# Patient Record
Sex: MALE | Race: WHITE | NOT HISPANIC OR LATINO | URBAN - METROPOLITAN AREA
[De-identification: names, ages, dates, MRNs, and addresses within clinical notes are randomized per-mention and may not be internally consistent; named-entity substitution may affect disease eponyms.]

---
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
## 2021-05-07 NOTE — PROGRESS NOTES
Progress Note      Patient Name: Alden Khan   Patient ID: [id number]   Sex: Male   YOB: 2000        Visit Date: August 9, 2018    Provider: RONDA Guadalupe   Location: [facility], [place], operated by [facility]   Location Address: [address] [place] [address]  [id number]   Location Phone: [phone]          Chief Complaint     here for vaccine update only       History Of Present Illness  Alden Khan is a 17 year old /White male who presents for evaluation and treatment of:      here for immunizations - he is a Mikie at [employer] - no issues or complaints today       Past Medical History  Disease Name Date Onset Notes   **No Past Medical History --  --          Allergy List  Allergen Name Date Reaction Notes   NO KNOWN DRUG ALLERGIES --  --  --          Family Medical History  Disease Name Relative/Age Notes   Hypertension / Mother    Mother/          Social History  Finding Status Start/Stop Quantity Notes   Alcohol Never --/-- --  never drinks alcohol   Recreational Drug Use Never --/-- --  never used   Second hand smoke exposure Unknown --/-- --  yes   Tobacco Never --/-- --  never smoker, never uses other tobacco products         Immunizations  NameDate Admin Mfg Trade Name Lot Number Route Inj VIS Given VIS Publication   DTaP09/12/2005 NE Not Entered  NE NE 08/08/2018    Comments:    DTaP04/03/2003 NE Not Entered  NE NE 08/08/2018    Comments:    DTaP02/26/2002 NE Not Entered  NE NE 08/08/2018    Comments:    DTaP01/04/2002 NE Not Entered  NE NE 08/08/2018    Comments:    DTaP2001 NE Not Entered  NE NE 08/08/2018    Comments:    HepB02/26/2002 NE Not Entered  NE NE 08/08/2018    Comments:    HepB2001 NE Not Entered  NE NE 08/08/2018    Comments:    HepB2001 NE Not Entered  NE NE 08/08/2018    Comments:    Hib04/03/2003 NE Not Entered  NE NE 08/08/2018    Comments:    Hib02/26/2002 NE Not Entered  NE NE 08/08/2018    Comments:    Hib01/04/2002 NE Not  "Entered  NE NE 08/08/2018    Comments:    Hib2001 NE Not Entered  NE NE 08/08/2018    Comments:    IPV09/16/2005 NE Not Entered  NE NE 08/08/2018    Comments:    IPV07/16/2002 NE Not Entered  NE NE 08/08/2018    Comments:    IPV01/04/2002 NE Not Entered  NE NE 08/08/2018    Comments:    IPV2001 NE Not Entered  NE NE 08/08/2018    Comments:    Nzmyiugwhpnay19/04/2013 NE Not Entered  NE NE 08/08/2018    Comments:    MMR09/12/2005 NE Not Entered  NE NE 08/08/2018    Comments:    MMR12/10/2002 NE Not Entered  NE NE 08/08/2018    Comments:    Diihgcf4300/10/2002 NE Not Entered  NE NE 08/08/2018    Comments:    Kxigtsq8874/28/2002 NE Not Entered  NE NE 08/08/2018    Comments:    Hkixznd6169/04/2002 NE Not Entered  NE NE 08/08/2018    Comments:    Zzzgemn422001 NE Not Entered  NE NE 08/08/2018    Comments:    Tdap03/04/2013 NE Not Entered  NE NE 08/08/2018    Comments:    Unerbseqg75/04/2013 NE Not Entered  NE NE 08/08/2018    Comments:    Autspysxj01/28/2002 NE Not Entered  NE NE 08/08/2018    Comments:          Review of Systems  · Constitutional  o Denies  o : fever, chills  · HENT  o Denies  o : sinus pain, nasal congestion, nasal discharge, sore throat  · Gastrointestinal  o Denies  o : nausea, vomiting, diarrhea      Vitals  Date Time BP Position Site L\R Cuff Size HR RR TEMP(F) WT  HT  BMI kg/m2 BSA m2 O2 Sat        08/09/2018 04:42 /76 Sitting    73 - R  98.2 198lbs 2oz 5'  8.5\" 29.69 2.08 98 %           Physical Examination  · Constitutional  o Appearance  o : well developed, well-nourished, in no acute distress  · Head and Face  o HEENT  o : Unremarkable  · Eyes  o Conjunctivae  o : conjunctivae normal  o Pupils and Irises  o : pupils equal and round, pupils reactive to light bilaterally  · Neck  o Inspection/Palpation  o : supple  o Thyroid  o : no thyromegaly  · Respiratory  o Respiratory Effort  o : breathing unlabored  o Auscultation of Lungs  o : clear to " ascultation  · Cardiovascular  o Heart  o :   § Auscultation of Heart  § : regular rate and rhythm  o Peripheral Vascular System  o :   § Extremities  § : no edema  · Lymphatic  o Neck  o : no lymphadenopathy present  · Musculoskeletal  o General  o :   § General Musculoskeletal  § : No joint swelling or deformity. Muscle tone, strength, and development grossly normal.  · Skin and Subcutaneous Tissue  o General Inspection  o : NL tone  · Neurologic  o Gait and Station  o :   § Gait Screening  § : normal gait  · Psychiatric  o Mood and Affect  o : mood normal, affect appropriate              Assessment  · Encounter for administration of vaccine     V05.9/Z23      Plan  · Orders  o Immunization Admin Fee (2+ Injections) ([facility]) (66936) - - 08/09/2018  o Immunization Admin Fee (Single) ([facility]) (99094) - - 08/09/2018  o ACO-39: Current medications updated and reviewed () - - 08/09/2018  o Havrix Pediatric/Adolescent Vaccine (720EL.U./0.5mL) (58135) - V05.9/Z23 - 08/09/2018   Vaccine - HepA; Dose: 0.5; Site: Left Upper Arm; Route: Intramuscular; Date: 08/09/2018 17:38:00; Exp: 06/26/2020; Lot: [id number]; Mfg: [employer]; TradeName: Havrix Peds 2 dose; Administered By: Arabella Jurado MA; Comment: NDC#[id number]  o Menveo Vaccine (75592) - V05.9/Z23 - 08/09/2018   Vaccine - Meningococcal; Dose: 0.5; Site: Right Upper Arm; Route: Intramuscular; Date: 08/09/2018 17:39:00; Exp: 07/28/2019; Lot: [id number]; Mfg: Not Entered; TradeName: Not Entered; Administered By: Arabella Jurado MA; Comment: NDC# [id number]  o Trumenba Vaccination (78676) - V05.9/Z23 - 08/09/2018   Vaccine - MenB; Dose: 0.5; Site: Right Upper Arm; Route: Intramuscular; Date: 08/09/2018 17:40:00; Exp: 12/28/2019; Lot: [id number]; Mfg: [employer]; TradeName: Trumenba; Administered By: Arabella Jurado MA; Comment: NDC# [id number]  · Instructions  o Handouts were given to patient: VIS sheets.   o Patient was educated/instructed on their diagnosis,  treatment and medications prior to discharge from the clinic today.  · Disposition  o Return Visit Request in/on 1 month +/- 2 days (5015).            Electronically Signed by: RONDA Guadalupe -Author on August 9, 2018 05:56:34 PM

## 2021-05-07 NOTE — PROGRESS NOTES
Progress Note      Patient Name: Alden Khan   Patient ID: [id number]   Sex: Male   YOB: 2000        Visit Date: July 5, 2019    Provider: RONDA Guadalupe   Location: [facility]   Location Address: [address] [place] [address]   [place]  [id number]   Location Phone: [phone]          Chief Complaint  · 18-year-old well child visit  · Sports physical      History Of Present Illness  The patient is here for a well child visit.   Interval History and Concerns  Nutrition  He is eating well-balanced meals and healthy snacks with no concerns. He drinks low-fat milk.   Social Development/Activities/Risk Factors  Home: no social concerns were raised regarding home.   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   Sports Participation: Alden Khan is participating in football and for his high school varsity team and.   Substance Use: the patient reports that he drinks alcohol occasionally, has never smoked and does not use drugs.   Puberty/Sexuality: The patient has attained normal sexual development for age. The patient reports being sexually active. He always uses condoms when having sex.   Mental Health: He denies any emotional or behavioral concerns.   The patient drives and uses a seat belt always. He rides an ATV and always wears a helmet.   There is no family history of elevated cholesterol levels or myocardial infarction before the age of 50.   Dental Screen  The child has a dental appointment in the next 6 months, going Monday due to right dental pain and ear discomfort. Brushes teeth daily.   Immunizations (Alt V)    Immunizations: Up to date      feels SOB with running - feels like he has wheezing when symptoms worsen since April       Past Medical History  Disease Name Date  Onset Notes   **No Past Medical History --  --          Allergy List  Allergen Name Date Reaction Notes   NO KNOWN DRUG ALLERGIES --  --  --          Family Medical History  Disease Name Relative/Age Notes   Hypertension Mother/   Mother         Social History  Finding Status Start/Stop Quantity Notes   Alcohol Never --/-- --  never drinks alcohol   Recreational Drug Use Never --/-- --  never used   Second hand smoke exposure Unknown --/-- --  yes   Tobacco Never --/-- --  never smoker, never uses other tobacco products         Immunizations  NameDate Admin Mfg Trade Name Lot Number Route Inj VIS Given VIS Publication   DTaP09/12/2005 NE Not Entered  NE NE 08/08/2018    Comments:    DTaP04/03/2003 NE Not Entered  NE NE 08/08/2018    Comments:    DTaP02/26/2002 NE Not Entered  NE NE 08/08/2018    Comments:    DTaP01/04/2002 NE Not Entered  NE NE 08/08/2018    Comments:    DTaP2001 NE Not Entered  NE NE 08/08/2018    Comments:    HepA02/15/2019 SKB Havrix Peds 2 dose [id number] IM LA 02/15/2019 07/20/2016   Comments: NDC 47637-869-70   HepA08/09/2018 SKB Havrix Peds 2 dose 3TG52 IM  08/09/2018 07/20/2016   Comments: NDC#[id number]   HepB02/26/2002 NE Not Entered  NE NE 08/08/2018    Comments:    HepB2001 NE Not Entered  NE NE 08/08/2018    Comments:    HepB2001 NE Not Entered  NE NE 08/08/2018    Comments:    Hib04/03/2003 NE Not Entered  NE NE 08/08/2018    Comments:    Hib02/26/2002 NE Not Entered  NE NE 08/08/2018    Comments:    Hib01/04/2002 NE Not Entered  NE NE 08/08/2018    Comments:    Hib2001 NE Not Entered  NE NE 08/08/2018    Comments:    IPV09/16/2005 NE Not Entered  NE NE 08/08/2018    Comments:    IPV07/16/2002 NE Not Entered  NE NE 08/08/2018    Comments:    IPV01/04/2002 NE Not Entered  NE NE 08/08/2018    Comments:    IPV2001 NE Not Entered  NE NE 08/08/2018    Comments:    MenB08/09/2018 GUNNER Cochran [id number]   08/09/2018 03/03/2016   Comments: NDC# [id number]  "  Zkdcgetveewrk56/09/2018 NE Not Entered [id number]   08/09/2018 03/03/2016   Comments: NDC# [id number]   Odskfysdgxcnm31/04/2013 NE Not Entered  NE NE 08/08/2018    Comments:    MMR09/12/2005 NE Not Entered  NE NE 08/08/2018    Comments:    MMR12/10/2002 NE Not Entered  NE NE 08/08/2018    Comments:    Enhjjfb1740/10/2002 NE Not Entered  NE NE 08/08/2018    Comments:    Yhwvkka3464/28/2002 NE Not Entered  NE NE 08/08/2018    Comments:    Lilfmho0261/04/2002 NE Not Entered  NE NE 08/08/2018    Comments:    Ltantke692001 NE Not Entered  NE NE 08/08/2018    Comments:    Tdap03/04/2013 NE Not Entered  NE NE 08/08/2018    Comments:    Mdgbaebzv51/04/2013 NE Not Entered  NE NE 08/08/2018    Comments:    Yganfvnrz38/28/2002 NE Not Entered  NE NE 08/08/2018    Comments:          Review of Systems  · Constitutional  o Denies  o : fatigue, fever  · Eyes  o Denies  o : discharge from eye, changes in vision  · HENT  o Admits  o : headaches  o Denies  o : difficulty hearing, nasal congestion  · Cardiovascular  o Denies  o : chest pain, poor exercise tolerance  · Respiratory  o Admits  o : shortness of breath, wheezing, cough  · Gastrointestinal  o Denies  o : vomiting, diarrhea, constipation  · Genitourinary  o Denies  o : dysuria, hematuria  · Integument  o Denies  o : rash, itching, new skin lesions  · Neurologic  o Denies  o : altered mental status, muscular weakness  · Musculoskeletal  o Denies  o : joint pain, joint swelling, limitation of motion  · Psychiatric  o Denies  o : anxiety, depression  · Heme-Lymph  o Denies  o : lymph node enlargement or tenderness      Vitals  Date Time BP Position Site L\R Cuff Size HR RR TEMP (F) WT  HT  BMI kg/m2 BSA m2 O2 Sat        07/05/2019 02:20 /90 Sitting    107 - R  97.6 178lbs 2oz 5'  8.25\" 26.89 1.97 98 %          Physical Examination  · Constitutional  o Appearance  o : no acute distress, well-nourished  · Head and Face  o Head  o :   § Inspection  § : atraumatic, " normocephalic  · Eyes  o Eyes  o : extraocular movements intact, no scleral icterus, no conjunctival injection  · Ears, Nose, Mouth and Throat  o Ears  o :   § External Ears  § : normal  § Otoscopic Examination  § : mild erythema of the right TM  o Nose  o :   § Intranasal Exam  § : nares patent  o Oral Cavity  o :   § Oral Mucosa  § : moist mucous membranes  o Throat  o :   § Oropharynx  § : no inflammation or lesions present, tonsils within normal limits  · Respiratory  o Respiratory Effort  o : breathing comfortably, symmetric chest rise  o Auscultation of Lungs  o : clear to asculatation bilaterally, no wheezes, rales, or rhonchii  · Cardiovascular  o Heart  o :   § Auscultation of Heart  § : regular rate and rhythm, no murmurs, rubs, or gallops  o Peripheral Vascular System  o :   § Extremities  § : no edema  · Gastrointestinal  o Abdominal Examination  o :   § Abdomen  § : soft, non-tender, bowel sounds +  · Genitourinary  o Penis  o : normal general appearance, no lesions present  o Genitals  o :   § Genitals  § : normal male, testes descended, no hernia palpated  · Skin and Subcutaneous Tissue  o General Inspection  o : NL tone  · Neurologic  o Mental Status Examination  o :   § Orientation  § : grossly oriented to person, place and time  o Gait and Station  o :   § Gait Screening  § : normal gait  · Psychiatric  o General  o : normal mood and affect              Assessment  · Well Child Examination     V20.2/Z00.129  · Counseling on Injury Prevention     V65.43/Z71.89  · Exercise-induced shortness of breath     786.05/R06.02  · Sports physical     V70.3/Z02.5      Plan  · Orders  o ACO-39: Current medications updated and reviewed () - - 07/05/2019  · Medications  o Singulair 10 mg oral tablet   SIG: take 1 tablet (10 mg) by oral route once daily in the evening for 30 days   DISP: (30) tablets with 2 refills  Prescribed on 07/05/2019     o Ventolin HFA 90 mcg/actuation inhalation HFA aerosol inhaler    SIG: inhale 1 - 2 puffs (90 - 180 mcg) by inhalation route every 4-6 hours as needed   DISP: (1) 8 gm canister with 0 refills  Prescribed on 07/05/2019     · Instructions  o Anticipatory guidance given - do not drink and drive.  o Handout given with age-specific care instructions and safety precautions.  o Discussed and discouraged drugs, alcohol, sex, and cigarettes.  o Encourage regular exercise.  o Always wear seat belts when riding in the car.  o Discussed dental care.  o Sports participation discussed and form completed.  o Follow up in 2-4 weeks with no improvement with medication for further exam. Pt denies chest pain.             Electronically Signed by: RONDA Guadalupe -Author on July 5, 2019 03:04:48 PM

## 2021-05-07 NOTE — PROGRESS NOTES
"   Progress Note      Patient Name: Alden Khan   Patient ID: [id number]   Sex: Male   YOB: 2000        Visit Date: July 9, 2018    Provider: Don Cannon MD   Location: [facility], [place], operated [facility]   Location Address: [address] [place] [address]  [id number]   Location Phone: [phone]          Chief Complaint     here for a sports physical           History Of Present Illness  Alden Khan is a 17 year old /White male who presents for evaluation and treatment of:      pt needs sports physical- no medical problems  no smoking, alcohol, drug use       Past Medical History  Disease Name Date Onset Notes   **No Past Medical History --  --          Allergy List  Allergen Name Date Reaction Notes   NO KNOWN DRUG ALLERGIES --  --  --          Family Medical History  Disease Name Relative/Age Notes   Hypertension / Mother    Mother/          Social History  Finding Status Start/Stop Quantity Notes   Alcohol Never --/-- --  never drinks alcohol   Recreational Drug Use Never --/-- --  never used   Second hand smoke exposure Unknown --/-- --  yes   Tobacco Never --/-- --  never smoker, never uses other tobacco products         Review of Systems  · Constitutional  o Denies  o : fatigue, fever  · Eyes  o Denies  o : double vision, impaired vision, blurred vision, changes in vision  · Cardiovascular  o Denies  o : chest pain, syncope, palpitations  · Respiratory  o Denies  o : shortness of breath, cough  · Gastrointestinal  o Denies  o : nausea, vomiting, diarrhea  · Integument  o Denies  o : rash  · Neurologic  o Denies  o : altered mental status, headaches, dizziness  · Musculoskeletal  o Denies  o : joint pain  · Psychiatric  o Denies  o : anxiety, depression      Vitals  Date Time BP Position Site L\R Cuff Size HR RR TEMP(F) WT  HT  BMI kg/m2 BSA m2 O2 Sat        07/09/2018 12:58 /80 Sitting    70 - R  97.2 200lbs 2oz 5'  8.5\" 29.99 2.09 97 %           Physical " Examination  · Constitutional  o Appearance  o : well developed, well-nourished, in no acute distress  · Head and Face  o HEENT  o : Unremarkable  · Eyes  o Conjunctivae  o : conjunctivae normal  o Pupils and Irises  o : pupils equal and round, pupils reactive to light bilaterally  · Neck  o Inspection/Palpation  o : supple  o Thyroid  o : no thyromegaly  · Respiratory  o Respiratory Effort  o : breathing unlabored  o Auscultation of Lungs  o : clear to ascultation  · Cardiovascular  o Heart  o :   § Auscultation of Heart  § : regular rate and rhythm  o Peripheral Vascular System  o :   § Extremities  § : no edema  · Gastrointestinal  o Abdomen  o : soft, non-tender, non-distended, + bowel sounds, no hepatosplenomegaly, no masses palpated  · Genitourinary  o MALE  EXAM:  o : bilateral no hernias, no masses palpated, normal exam  · Musculoskeletal  o General  o :   § General Musculoskeletal  § : No joint swelling or deformity., Muscle tone, strength, and development grossly normal.  · Neurologic  o Gait and Station  o :   § Gait Screening  § : normal gait  · Psychiatric  o Mood and Affect  o : mood normal, affect appropriate          Assessment  · Sports physical     V70.3/Z02.5      Plan  · Orders  o ACO-39: Current medications updated and reviewed () - - 07/09/2018  · Instructions  o Patient was educated/instructed on their diagnosis, treatment and medications prior to discharge from the clinic today.  o pt cleared for sports for 1 yr.            Electronically Signed by: Don Cannon MD -Author on July 9, 2018 01:28:10 PM

## 2021-05-07 NOTE — PROGRESS NOTES
Progress Note      Patient Name: Alden Khan   Patient ID: [id number]   Sex: Male   YOB: 2000        Visit Date: November 28, 2018    Provider: RONDA Martinez   Location: [facility]   Location Address: [address] [place] [address]  [id number]   Location Phone: [phone]          Chief Complaint  · Not feeling well for 4 days      History Of Present Illness  Alden Khan is a 18 year old /White male who presents for evaluation and treatment of:      not feeling well for 4 days. His mother wanted him to come in. He started vomiting today, but has been very fatigued the past 3-4 days. He describes headaches, ear pain, sore throat. No abdominal pain or diarrhea. He has been going to school despite not feeling well.       Past Medical History  Disease Name Date Onset Notes   **No Past Medical History --  --          Allergy List  Allergen Name Date Reaction Notes   NO KNOWN DRUG ALLERGIES --  --  --          Family Medical History  Disease Name Relative/Age Notes   Hypertension / Mother    Mother/          Social History  Finding Status Start/Stop Quantity Notes   Alcohol Never --/-- --  never drinks alcohol   Recreational Drug Use Never --/-- --  never used   Second hand smoke exposure Unknown --/-- --  yes   Tobacco Never --/-- --  never smoker, never uses other tobacco products         Immunizations  Name Date Admin   DTaP 09/12/2005   DTaP 04/03/2003   DTaP 02/26/2002   DTaP 01/04/2002   DTaP 2001   HepA 08/09/2018   HepB 02/26/2002   HepB 2001   HepB 2001   Hib 04/03/2003   Hib 02/26/2002   Hib 01/04/2002   Hib 2001   IPV 09/16/2005   IPV 07/16/2002   IPV 01/04/2002   IPV 2001   MenB 08/09/2018   Meningococcal 08/09/2018   Meningococcal 03/04/2013   MMR 09/12/2005   MMR 12/10/2002   Yzakqrc59 12/10/2002   Xtgqwjt04 08/28/2002   Wtizrap47 01/04/2002   Fqyymws63 2001   Tdap 03/04/2013   Varicella 03/04/2013    Varicella 08/28/2002         Review of Systems  · Constitutional  o Admits  o : fatigue, chills  o Denies  o : fever  · Eyes  o Denies  o : discharge from eye  · HENT  o Admits  o : headaches, nasal discharge, sore throat, ear pain  o Denies  o : vertigo, lightheadedness, sinus pain, nasal congestion, tinnitus, ear fullness  · Cardiovascular  o Denies  o : chest pain, palpitations  · Respiratory  o Admits  o : cough  o Denies  o : shortness of breath, wheezing  · Gastrointestinal  o Admits  o : vomiting  o Denies  o : nausea, diarrhea, abdominal pain  · Integument  o Denies  o : rash      Vitals  Date Time BP Position Site L\R Cuff Size HR RR TEMP(F) WT  HT  BMI kg/m2 BSA m2 O2 Sat HC       11/28/2018 03:48 /84 Sitting    75 - R 18 98 192lbs 0oz    98 %          Physical Examination  · Constitutional  o Appearance  o : well developed, well-nourished, in no acute distress  · Eyes  o Conjunctivae  o : conjunctivae normal, no exudates present  o Pupils and Irises  o : pupils equal and round, pupils reactive to light bilaterally  · Ears, Nose, Mouth and Throat  o Ears  o :   § External Ears  § : auricle appearance normal bilaterally, no auricle tenderness to palpation present, external auditory canal appearance within normal limits  § Otoscopic Examination  § : tympanic membrane appearance within normal limits bilaterally  § Hearing  § : response to sound normal, no tinnitus  o Nose  o :   § External Nose  § : appearance normal  § Intranasal Exam  § : mucosa within normal limits, sinuses non tender to percussion  o Throat  o :   § Oropharynx  § : generalized hyperemia noted, tonsils within normal limits  · Neck  o Inspection/Palpation  o : supple  · Respiratory  o Respiratory Effort  o : breathing unlabored  o Auscultation of Lungs  o : clear to ascultation  · Cardiovascular  o Heart  o :   § Auscultation of Heart  § : regular rate and rhythm  o Peripheral Vascular System  o :   § Extremities  § : no  edema  · Gastrointestinal  o Abdominal Examination  o :   § Abdomen  § : soft, non-tender, non-distended, bowel sounds +  · Lymphatic  o Neck  o : no lymphadenopathy present  · Musculoskeletal  o General  o :   § General Musculoskeletal  § : Muscle tone, strength, and development grossly normal.  · Skin and Subcutaneous Tissue  o General Inspection  o : no lesions present, no areas of discoloration, skin turgor normal, texture normal  · Neurologic  o Gait and Station  o :   § Gait Screening  § : normal gait  · Psychiatric  o Mood and Affect  o : mood normal, affect appropriate              Assessment  · Fatigue     780.79/R53.83  · Headache     784.0/R51  · Pharyngitis, acute     462/J02.9  · Vomiting     787.03/R11.10      Plan  · Orders  o CBC with Auto Diff [facility] (81215) - 780.79/R53.83, 784.0/R51, 462/J02.9, 787.03/R11.10 - 11/28/2018  o Influenza A/B test (80671) - 780.79/R53.83, 784.0/R51, 462/J02.9, 787.03/R11.10 - 11/28/2018   negative A/B  o IOP - Rapid Strep (42346) - 462/J02.9 - 11/28/2018   negative  o ACO-39: Current medications updated and reviewed () - - 11/28/2018  o Nadine-Barr virus (EBV) antibody panel (41119, 54952, 69020) - 780.79/R53.83, 784.0/R51, 462/J02.9, 787.03/R11.10 - 11/28/2018  · Instructions  o Patient was educated/instructed on their diagnosis, treatment and medications prior to discharge from the clinic today. Strep A negative. Flu A/B negative. Will send out CBC and EBV panel.   · Disposition  o Call or Return if symptoms worsen or persist.            Electronically Signed by: RONDA Martinez -Author on November 29, 2018 09:23:33 AM

## 2021-05-07 NOTE — PROGRESS NOTES
"   Progress Note      Patient Name: Alden Khan   Patient ID: [id number]   Sex: Male   YOB: 2000        Visit Date: February 15, 2019    Provider: RONDA Guadalupe   Location: [facility]   Location Address: [address] [place] [address]  [id number]   Location Phone: [phone]          Chief Complaint     here for shot update, second Hep A       History Of Present Illness  Alden Khan is a 18 year old /White male who presents for evaluation and treatment of:      2nd Hep A - no issues with first immunization    states that his depression symptoms are \"fine\" and better now       Past Medical History  Disease Name Date Onset Notes   **No Past Medical History --  --          Allergy List  Allergen Name Date Reaction Notes   NO KNOWN DRUG ALLERGIES --  --  --          Family Medical History  Disease Name Relative/Age Notes   Hypertension / Mother    Mother/          Social History  Finding Status Start/Stop Quantity Notes   Alcohol Never --/-- --  never drinks alcohol   Recreational Drug Use Never --/-- --  never used   Second hand smoke exposure Unknown --/-- --  yes   Tobacco Never --/-- --  never smoker, never uses other tobacco products         Immunizations  NameDate Admin Mfg Trade Name Lot Number Route Inj VIS Given VIS Publication   DTaP09/12/2005 NE Not Entered  NE NE 08/08/2018    Comments:    DTaP04/03/2003 NE Not Entered  NE NE 08/08/2018    Comments:    DTaP02/26/2002 NE Not Entered  NE NE 08/08/2018    Comments:    DTaP01/04/2002 NE Not Entered  NE NE 08/08/2018    Comments:    DTaP2001 NE Not Entered  NE NE 08/08/2018    Comments:    HepA08/09/2018 SKB Havrix Peds 2 dose 3TG52 IM  08/09/2018 07/20/2016   Comments: NDC#[id number]   HepB02/26/2002 NE Not Entered  NE NE 08/08/2018    Comments:    HepB2001 NE Not Entered  NE NE 08/08/2018    Comments:    HepB2001 NE Not Entered  NE NE 08/08/2018    Comments:    Hib04/03/2003 " "NE Not Entered  NE NE 08/08/2018    Comments:    Hib02/26/2002 NE Not Entered  NE NE 08/08/2018    Comments:    Hib01/04/2002 NE Not Entered  NE NE 08/08/2018    Comments:    Hib2001 NE Not Entered  NE NE 08/08/2018    Comments:    IPV09/16/2005 NE Not Entered  NE NE 08/08/2018    Comments:    IPV07/16/2002 NE Not Entered  NE NE 08/08/2018    Comments:    IPV01/04/2002 NE Not Entered  NE NE 08/08/2018    Comments:    IPV2001 NE Not Entered  NE NE 08/08/2018    Comments:    MenB08/09/2018 [facility] [id number]   08/09/2018 03/03/2016   Comments: NDC# [id number]   Zcfbtobaetusr08/09/2018 NE Not Entered [id number] IM  08/09/2018 03/03/2016   Comments: NDC# [id number]   Qotukfzwddjel55/04/2013 NE Not Entered  NE NE 08/08/2018    Comments:    MMR09/12/2005 NE Not Entered  NE NE 08/08/2018    Comments:    MMR12/10/2002 NE Not Entered  NE NE 08/08/2018    Comments:    Tgymefl3762/10/2002 NE Not Entered  NE NE 08/08/2018    Comments:    Kpiseis1542/28/2002 NE Not Entered  NE NE 08/08/2018    Comments:    Dmtxmcl3473/04/2002 NE Not Entered  NE NE 08/08/2018    Comments:    Sfamdtq072001 NE Not Entered  NE NE 08/08/2018    Comments:    Tdap03/04/2013 NE Not Entered  NE NE 08/08/2018    Comments:    Pxaxcaveg45/04/2013 NE Not Entered  NE NE 08/08/2018    Comments:    Elvdepewh60/28/2002 NE Not Entered  NE NE 08/08/2018    Comments:          Review of Systems  · Constitutional  o Denies  o : fever, chills, body aches  · HENT  o Denies  o : headaches  · Respiratory  o Denies  o : wheezing, cough      Vitals  Date Time BP Position Site L\R Cuff Size HR RR TEMP(F) WT  HT  BMI kg/m2 BSA m2 O2 Sat        02/15/2019 04:08 /76 Sitting    75 - R  97.9 192lbs 0oz 5'  8.5\" 28.77 2.05 97 %           Physical Examination  · Constitutional  o Appearance  o : well developed, well-nourished, in no acute distress  · Eyes  o Conjunctivae  o : conjunctivae normal  o Pupils and Irises  o : pupils equal and round, pupils " reactive to light bilaterally  · Neck  o Inspection/Palpation  o : supple  o Thyroid  o : no thyromegaly  · Respiratory  o Respiratory Effort  o : breathing unlabored  o Auscultation of Lungs  o : clear to ascultation  · Cardiovascular  o Heart  o :   § Auscultation of Heart  § : regular rate and rhythm  o Peripheral Vascular System  o :   § Extremities  § : no edema  · Lymphatic  o Neck  o : no lymphadenopathy present  · Musculoskeletal  o General  o :   § General Musculoskeletal  § : No joint swelling or deformity. Muscle tone, strength, and development grossly normal.  · Skin and Subcutaneous Tissue  o General Inspection  o : NL tone  · Neurologic  o Gait and Station  o :   § Gait Screening  § : normal gait  · Psychiatric  o Mood and Affect  o : mood normal, affect appropriate              Assessment  · Encounter for administration of vaccine     V05.9/Z23      Plan  · Orders  o ACO-39: Current medications updated and reviewed () - - 02/15/2019  o IM - Injection Fee (04785) - - 02/15/2019  o Havrix Pediatric/Adolescent Vaccine (720EL.U./0.5mL) (20235) - V05.9/Z23 - 02/15/2019   Vaccine - HepA; Dose: 0.5; Site: Left Arm; Route: Intramuscular; Date: 02/15/2019 16:10:00; Exp: 08/07/2020; Lot: [id number]; Mfg: [employer]; TradeName: Havrix Peds 2 dose; Administered By: Lisa Cannon MA; Comment: NDC 98965-128-05  · Instructions  o Patient was educated/instructed on their diagnosis, treatment and medications prior to discharge from the clinic today.  · Disposition  o Follow up as needed.            Electronically Signed by: RONDA Guadalupe -Author on February 15, 2019 04:29:58 PM

## 2021-05-07 NOTE — PROGRESS NOTES
Progress Note      Patient Name: Alden Khan   Patient ID: [id number]   Sex: Male   YOB: 2000        Visit Date: December 13, 2018    Provider: RONDA Guadalupe   Location: [facility] [place]   Location Address: [address] [place] [address]  [id number]   Location Phone: [phone]          Chief Complaint     nausea, achy, vomiting and diarrhea, can't sleep. has been going on for 2-3 weeks, was seen for this last visit. told to follow up if not better.       History Of Present Illness  Alden Khan is a 18 year old /White male who presents for evaluation and treatment of:      nausea, body aches, headaches and vomiting x 2-3 days - could not sleep last night due to headache     here a few weeks ago and his symptoms resolved until about 2 days ago    Pt then states that his mother and brother told him to discuss possible depression. He states he isolates himself at home and school that he doesn't really like people at school. He has difficulty falling asleep because he is constantly thinking. His grades are worsening and states he just doesn't care about anything - denies SI.       Past Medical History  Disease Name Date Onset Notes   **No Past Medical History --  --          Allergy List  Allergen Name Date Reaction Notes   NO KNOWN DRUG ALLERGIES --  --  --          Family Medical History  Disease Name Relative/Age Notes   Hypertension / Mother    Mother/          Social History  Finding Status Start/Stop Quantity Notes   Alcohol Never --/-- --  never drinks alcohol   Recreational Drug Use Never --/-- --  never used   Second hand smoke exposure Unknown --/-- --  yes   Tobacco Never --/-- --  never smoker, never uses other tobacco products         Immunizations  NameDate Admin Mfg Trade Name Lot Number Route Inj VIS Given VIS Publication   DTaP09/12/2005 NE Not Entered  NE NE 08/08/2018    Comments:    DTaP04/03/2003 NE Not Entered  NE NE 08/08/2018     Comments:    DTaP02/26/2002 NE Not Entered  NE NE 08/08/2018    Comments:    DTaP01/04/2002 NE Not Entered  NE NE 08/08/2018    Comments:    DTaP2001 NE Not Entered  NE NE 08/08/2018    Comments:    HepA08/09/2018 SKB Havrix Peds 2 dose 3TG52 IM  08/09/2018 07/20/2016   Comments: NDC#[id number]   HepB02/26/2002 NE Not Entered  NE NE 08/08/2018    Comments:    HepB2001 NE Not Entered  NE NE 08/08/2018    Comments:    HepB2001 NE Not Entered  NE NE 08/08/2018    Comments:    Hib04/03/2003 NE Not Entered  NE NE 08/08/2018    Comments:    Hib02/26/2002 NE Not Entered  NE NE 08/08/2018    Comments:    Hib01/04/2002 NE Not Entered  NE NE 08/08/2018    Comments:    Hib2001 NE Not Entered  NE NE 08/08/2018    Comments:    IPV09/16/2005 NE Not Entered  NE NE 08/08/2018    Comments:    IPV07/16/2002 NE Not Entered  NE NE 08/08/2018    Comments:    IPV01/04/2002 NE Not Entered  NE NE 08/08/2018    Comments:    IPV2001 NE Not Entered  NE NE 08/08/2018    Comments:    MenB08/09/2018 [facility] [id number] IM  08/09/2018 03/03/2016   Comments: NDC# [id number]   Iipeixbqwgelr39/09/2018 NE Not Entered [id number] IM  08/09/2018 03/03/2016   Comments: NDC# [id number]   Abhsmtcekkpue40/04/2013 NE Not Entered  NE NE 08/08/2018    Comments:    MMR09/12/2005 NE Not Entered  NE NE 08/08/2018    Comments:    MMR12/10/2002 NE Not Entered  NE NE 08/08/2018    Comments:    Gahyhzw3111/10/2002 NE Not Entered  NE NE 08/08/2018    Comments:    Auwddxe6850/28/2002 NE Not Entered  NE NE 08/08/2018    Comments:    Ltstykk5779/04/2002 NE Not Entered  NE NE 08/08/2018    Comments:    Bpagffk552001 NE Not Entered  NE NE 08/08/2018    Comments:    Tdap03/04/04/2013 NE Not Entered  NE NE 08/08/2018    Comments:    Habuthsqb99/04/2013 NE Not Entered  NE NE 08/08/2018    Comments:    Pvoposyyq74/28/2002 NE Not Entered  NE NE 08/08/2018    Comments:          Review of Systems  · Constitutional  o Admits  o : headache, chills,  body aches  o Denies  o : fever  · HENT  o Admits  o : headaches, nasal congestion  o Denies  o : nasal discharge, neck stiffness, neck pain, neck tenderness, sore throat, swollen glands in neck  · Respiratory  o Admits  o : cough  o Denies  o : wheezing  · Gastrointestinal  o Admits  o : nausea, vomiting, diarrhea      Vitals  Date Time BP Position Site L\R Cuff Size HR RR TEMP(F) WT  HT  BMI kg/m2 BSA m2 O2 Sat HC       12/13/2018 04:00 /78 Sitting    95 - R  98 188lbs 0oz    98 %           Physical Examination  · Constitutional  o Appearance  o : well developed, well-nourished, in no acute distress  · Eyes  o Conjunctivae  o : conjunctivae normal  o Pupils and Irises  o : pupils equal and round, pupils reactive to light bilaterally  · Ears, Nose, Mouth and Throat  o Ears  o :   § External Ears  § : no auricle tenderness to palpation present  § Otoscopic Examination  § : tympanic membrane appearance within normal limits bilaterally  § Hearing  § : response to sound normal, no tinnitus  o Throat  o :   § Oropharynx  § : no inflammation or lesions present  · Neck  o Inspection/Palpation  o : supple  o Thyroid  o : no thyromegaly  · Respiratory  o Respiratory Effort  o : breathing unlabored  o Auscultation of Lungs  o : clear to ascultation  · Cardiovascular  o Heart  o :   § Auscultation of Heart  § : regular rate and rhythm  o Peripheral Vascular System  o :   § Extremities  § : no edema  · Lymphatic  o Neck  o : no lymphadenopathy present  · Musculoskeletal  o General  o :   § General Musculoskeletal  § : No joint swelling or deformity. Muscle tone, strength, and development grossly normal.  · Skin and Subcutaneous Tissue  o General Inspection  o : NL tone  · Neurologic  o Gait and Station  o :   § Gait Screening  § : normal gait  · Psychiatric  o Mood and Affect  o : mood normal, flattened               Assessment  · Major depressive disorder     296.20/F32.2  · Upper respiratory  infection     465.9/J06.9  · Body aches     780.96/R52  · Depression     311/F32.9      Plan  · Orders  o ACO-18: Positive screen for clinical depression using a standardized tool and a follow-up plan documented () - - 12/14/2018  o ACO-39: Current medications updated and reviewed () - - 12/13/2018  o IOP - Influenza A/B Test (30303) - 780.96/R52 - 12/13/2018   Negative  · Medications  o Zofran 8 mg oral tablet   SIG: take 1 tablet by oral route 3 times a day as needed for 3 days   DISP: (15) tablets with 0 refills  Prescribed on 12/13/2018     o amoxicillin 500 mg oral tablet   SIG: take 1 tablet (500 mg) by oral route every 12 hours for 10 days   DISP: (20) tablets with 0 refills  Prescribed on 12/13/2018     · Instructions  o Handouts were given to patient: Mental health numbers  o Take all medications as prescribed/directed.  o Patient was educated/instructed on their diagnosis, treatment and medications prior to discharge from the clinic today.  o Pt to schedule an appointment within the next 2 weeks - pt states that he would like to see counseling before starting on medication.  · Disposition  o Return Visit Request in/on 2 weeks +/- 2 days (0267).            Electronically Signed by: RONDA Guadalupe -Author on December 14, 2018 05:42:54 PM

## 2021-05-07 NOTE — PROGRESS NOTES
Progress Note      Patient Name: Alden Khan   Patient ID: [id number]   Sex: Male   YOB: 2000        Visit Date: February 7, 2018    Provider: RONDA Martinez   Location: [facility]   Location Address: [address] [place] [address]  [id number]   Location Phone: [phone]          Chief Complaint     cough, ears hurt, fever, body aches, fatigue       History Of Present Illness  Alden Khan is a 17 year old /White male who presents for evaluation and treatment of:      not feeling well for a week now.     Describes body aches, ear ache, sore throat, and fatigue. He hasn't been able to eat due to decreased appetite and his throat hurts. He has had nausea and vomiting the first few days. He had a fever the first few days--the last known fever was Sunday or Monday. He has had cough and chest congestion--no wheezing or shortness of breath--no sputum production.       Past Medical History  Disease Name Date Onset Notes   **No Past Medical History --  --          Allergy List  Allergen Name Date Reaction Notes   NO KNOWN DRUG ALLERGIES --  --  --          Family Medical History  Disease Name Relative/Age Notes   Hypertension / Mother    Mother/          Social History  Finding Status Start/Stop Quantity Notes   Alcohol Never --/-- --  never drinks alcohol   Recreational Drug Use Never --/-- --  never used   Second hand smoke exposure Unknown --/-- --  yes   Tobacco Never --/-- --  never smoker, never uses other tobacco products         Review of Systems  · Constitutional  o Admits  o : fatigue, fever, body aches  o Denies  o : chills  · Eyes  o Denies  o : discharge from eye, blurred or double vision  · HENT  o Admits  o : headaches, sore throat, ear pain, ear fullness  o Denies  o : vertigo, lightheadedness, sinus pain, nasal congestion, nasal discharge, tinnitus  · Cardiovascular  o Denies  o : chest pain, irregular heart beats, syncope, dyspnea on  "exertion  · Respiratory  o Admits  o : cough  o Denies  o : shortness of breath, wheezing  · Gastrointestinal  o Admits  o : nausea, vomiting, loss of appetite  o Denies  o : diarrhea, constipation, abdominal pain  · Integument  o Denies  o : rash      Vitals  Date Time BP Position Site L\R Cuff Size HR RR TEMP(F) WT  HT  BMI kg/m2 BSA m2 O2 Sat HC       02/07/2018 10:19 /80 Sitting    78 - R  97.4 184lbs 4oz 5'  8\" 28.01 2 97 %           Physical Examination  · Constitutional  o Appearance  o : well developed, well-nourished, in no acute distress  · Ears, Nose, Mouth and Throat  o Ears  o :   § External Ears  § : auricle appearance normal bilaterally, no auricle tenderness to palpation present, external auditory canal appearance within normal limits  § Otoscopic Examination  § : tympanic membrane dull in appearance   o Throat  o :   § Oropharynx  § : generalized hyperemia noted, tonsils surgically absent   · Neck  o Inspection/Palpation  o : normal appearance, no masses or tenderness, trachea midline  · Respiratory  o Respiratory Effort  o : breathing unlabored  o Auscultation of Lungs  o : clear to auscultation  · Cardiovascular  o Heart  o :   § Auscultation of Heart  § : regular rate and rhythm  · Gastrointestinal  o Abdomen  o : soft, non-tender, non-distended, + bowel sounds, no hepatosplenomegaly, no masses palpated  · Lymphatic  o Neck  o : no lymphadenopathy present, normal size  · Musculoskeletal  o General  o :   § General Musculoskeletal  § : No joint swelling or deformity., Muscle tone, strength, and development grossly normal.  · Skin and Subcutaneous Tissue  o General Inspection  o : normal tone  · Neurologic  o Mental Status Examination  o :   § Orientation  § : grossly oriented to person, place and time  · Psychiatric  o General  o : normal affect and calm              Assessment  · Upper respiratory infection     465.9/J06.9  · Sore throat     462/J02.9      Plan  · Orders  o IOP - Rapid " Strep (06533) - 462/J02.9 - 02/07/2018   Strep A negative  o ACO-39: Current medications updated and reviewed () - - 02/07/2018  · Medications  o amoxicillin 500 mg oral capsule   SIG: take 1 capsule (500 mg) by oral route every 12 hours for 10 days   DISP: (20) capsules with 0 refills  Prescribed on 02/07/2018     · Instructions  o Take all medications as prescribed/directed.  o Rest. Increase Fluids.  o Patient was educated/instructed on their diagnosis, treatment and medications prior to discharge from the clinic today.  · Disposition  o Call or Return if symptoms worsen or persist.            Electronically Signed by: RONDA Martinez -Author on February 7, 2018 10:58:58 AM

## 2021-05-09 VITALS
WEIGHT: 188 LBS | SYSTOLIC BLOOD PRESSURE: 114 MMHG | HEART RATE: 95 BPM | OXYGEN SATURATION: 98 % | TEMPERATURE: 98 F | DIASTOLIC BLOOD PRESSURE: 78 MMHG

## 2021-05-09 VITALS
HEIGHT: 68 IN | TEMPERATURE: 97.4 F | BODY MASS INDEX: 27.92 KG/M2 | OXYGEN SATURATION: 97 % | WEIGHT: 184.25 LBS | HEART RATE: 78 BPM | DIASTOLIC BLOOD PRESSURE: 80 MMHG | SYSTOLIC BLOOD PRESSURE: 120 MMHG

## 2021-05-09 VITALS
SYSTOLIC BLOOD PRESSURE: 124 MMHG | OXYGEN SATURATION: 98 % | WEIGHT: 198.12 LBS | HEIGHT: 68 IN | DIASTOLIC BLOOD PRESSURE: 76 MMHG | BODY MASS INDEX: 30.03 KG/M2 | TEMPERATURE: 98.2 F | HEART RATE: 73 BPM

## 2021-05-09 VITALS
TEMPERATURE: 97.2 F | WEIGHT: 200.12 LBS | OXYGEN SATURATION: 97 % | DIASTOLIC BLOOD PRESSURE: 80 MMHG | HEART RATE: 70 BPM | SYSTOLIC BLOOD PRESSURE: 126 MMHG | HEIGHT: 68 IN | BODY MASS INDEX: 30.33 KG/M2

## 2021-05-09 VITALS
TEMPERATURE: 97.9 F | DIASTOLIC BLOOD PRESSURE: 76 MMHG | SYSTOLIC BLOOD PRESSURE: 116 MMHG | HEART RATE: 75 BPM | OXYGEN SATURATION: 97 % | BODY MASS INDEX: 29.1 KG/M2 | HEIGHT: 68 IN | WEIGHT: 192 LBS

## 2021-05-09 VITALS
DIASTOLIC BLOOD PRESSURE: 90 MMHG | WEIGHT: 178.12 LBS | BODY MASS INDEX: 26.99 KG/M2 | SYSTOLIC BLOOD PRESSURE: 140 MMHG | HEART RATE: 107 BPM | OXYGEN SATURATION: 98 % | HEIGHT: 68 IN | TEMPERATURE: 97.6 F

## 2021-05-09 VITALS
RESPIRATION RATE: 18 BRPM | TEMPERATURE: 98 F | OXYGEN SATURATION: 98 % | WEIGHT: 192 LBS | DIASTOLIC BLOOD PRESSURE: 84 MMHG | HEART RATE: 75 BPM | SYSTOLIC BLOOD PRESSURE: 138 MMHG